# Patient Record
Sex: MALE | Race: WHITE | NOT HISPANIC OR LATINO
[De-identification: names, ages, dates, MRNs, and addresses within clinical notes are randomized per-mention and may not be internally consistent; named-entity substitution may affect disease eponyms.]

---

## 2017-02-08 ENCOUNTER — APPOINTMENT (OUTPATIENT)
Dept: SURGERY | Facility: CLINIC | Age: 72
End: 2017-02-08

## 2017-02-08 VITALS
TEMPERATURE: 98.2 F | HEART RATE: 48 BPM | DIASTOLIC BLOOD PRESSURE: 80 MMHG | RESPIRATION RATE: 16 BRPM | OXYGEN SATURATION: 97 % | SYSTOLIC BLOOD PRESSURE: 118 MMHG

## 2017-12-07 ENCOUNTER — APPOINTMENT (OUTPATIENT)
Dept: SURGERY | Facility: CLINIC | Age: 72
End: 2017-12-07
Payer: MEDICARE

## 2017-12-07 VITALS
OXYGEN SATURATION: 97 % | HEART RATE: 50 BPM | TEMPERATURE: 97.8 F | SYSTOLIC BLOOD PRESSURE: 112 MMHG | RESPIRATION RATE: 15 BRPM | DIASTOLIC BLOOD PRESSURE: 82 MMHG

## 2017-12-07 PROCEDURE — 99212 OFFICE O/P EST SF 10 MIN: CPT | Mod: 25

## 2017-12-07 PROCEDURE — 46600 DIAGNOSTIC ANOSCOPY SPX: CPT

## 2017-12-07 RX ORDER — HYDROCORTISONE ACETATE AND PRAMOXINE HYDROCHLORIDE 25; 10 MG/G; MG/G
2.5-1 CREAM TOPICAL 3 TIMES DAILY
Qty: 30 | Refills: 3 | Status: DISCONTINUED | COMMUNITY
Start: 2017-02-08 | End: 2017-12-07

## 2018-02-14 ENCOUNTER — APPOINTMENT (OUTPATIENT)
Dept: SURGERY | Facility: CLINIC | Age: 73
End: 2018-02-14
Payer: MEDICARE

## 2018-02-14 VITALS
RESPIRATION RATE: 15 BRPM | WEIGHT: 170 LBS | TEMPERATURE: 98 F | DIASTOLIC BLOOD PRESSURE: 64 MMHG | BODY MASS INDEX: 23.03 KG/M2 | HEART RATE: 49 BPM | HEIGHT: 72 IN | OXYGEN SATURATION: 99 % | SYSTOLIC BLOOD PRESSURE: 114 MMHG

## 2018-02-14 DIAGNOSIS — K62.89 OTHER SPECIFIED DISEASES OF ANUS AND RECTUM: ICD-10-CM

## 2018-02-14 PROCEDURE — 99214 OFFICE O/P EST MOD 30 MIN: CPT | Mod: 25

## 2018-02-14 PROCEDURE — 46600 DIAGNOSTIC ANOSCOPY SPX: CPT

## 2018-11-13 PROBLEM — L29.0 PRURITUS ANI: Status: ACTIVE | Noted: 2018-02-14

## 2018-11-14 ENCOUNTER — APPOINTMENT (OUTPATIENT)
Dept: SURGERY | Facility: CLINIC | Age: 73
End: 2018-11-14
Payer: MEDICARE

## 2018-11-14 VITALS
SYSTOLIC BLOOD PRESSURE: 122 MMHG | HEART RATE: 67 BPM | OXYGEN SATURATION: 98 % | TEMPERATURE: 98.2 F | RESPIRATION RATE: 16 BRPM | DIASTOLIC BLOOD PRESSURE: 69 MMHG

## 2018-11-14 DIAGNOSIS — M62.838 OTHER MUSCLE SPASM: ICD-10-CM

## 2018-11-14 DIAGNOSIS — L29.0 PRURITUS ANI: ICD-10-CM

## 2018-11-14 PROCEDURE — 46600 DIAGNOSTIC ANOSCOPY SPX: CPT

## 2018-11-14 PROCEDURE — 99213 OFFICE O/P EST LOW 20 MIN: CPT | Mod: 25

## 2018-11-14 RX ORDER — HYDROCORTISONE 25 MG/G
2.5 CREAM TOPICAL 3 TIMES DAILY
Qty: 30 | Refills: 3 | Status: DISCONTINUED | COMMUNITY
Start: 2017-12-13 | End: 2018-11-14

## 2018-11-14 RX ORDER — SILVER SULFADIAZINE 10 MG/G
1 CREAM TOPICAL
Qty: 400 | Refills: 3 | Status: DISCONTINUED | COMMUNITY
Start: 2018-02-14 | End: 2018-11-14

## 2018-12-04 RX ORDER — MINERAL,LANOLIN OILS/PROP GLYC
LOTION (ML) TOPICAL
Qty: 1 | Refills: 2 | Status: ACTIVE | COMMUNITY
Start: 2018-12-04 | End: 1900-01-01

## 2019-11-13 ENCOUNTER — APPOINTMENT (OUTPATIENT)
Dept: SURGERY | Facility: CLINIC | Age: 74
End: 2019-11-13
Payer: MEDICARE

## 2019-11-13 DIAGNOSIS — Z83.79 FAMILY HISTORY OF OTHER DISEASES OF THE DIGESTIVE SYSTEM: ICD-10-CM

## 2019-11-13 DIAGNOSIS — G10 HUNTINGTON'S DISEASE: ICD-10-CM

## 2019-11-13 PROCEDURE — 46600 DIAGNOSTIC ANOSCOPY SPX: CPT

## 2019-11-13 PROCEDURE — 99212 OFFICE O/P EST SF 10 MIN: CPT | Mod: 25

## 2019-11-13 RX ORDER — OLMESARTAN MEDOXOMIL 20 MG/1
20 TABLET, FILM COATED ORAL
Refills: 0 | Status: DISCONTINUED | COMMUNITY
End: 2019-11-13

## 2019-11-13 NOTE — PHYSICAL EXAM
[FreeTextEntry1] : Perianal inspection digital exam and anoscopy remarkable for mild hemorrhoid enlargement. Perianal skin unremarkable. No sphincter spasm.

## 2019-11-13 NOTE — ASSESSMENT
[FreeTextEntry1] : No active anorectal pathology that requires treatment. Patient should return p.r.n.

## 2019-11-13 NOTE — HISTORY OF PRESENT ILLNESS
[FreeTextEntry1] : Mauri is a 73 y/o male here for a follow-up visit. Patient had levator spasm after IRC years ago. Spasm successfully managed with physical therapy. \par Patient last seen 11/14/18 with pruritus ani and levator spasm. Patient was advised to resume PT if symptoms recurred. \par Patient reports rare minor spasms with BMs. Denies bleeding. Patient has 1 normal formed BM daily. \par Takes fiber supplement daily. \par Diagnosed with Huntingtons Disease 4 months ago.

## 2019-11-21 ENCOUNTER — OUTPATIENT (OUTPATIENT)
Dept: OUTPATIENT SERVICES | Facility: HOSPITAL | Age: 74
LOS: 1 days | End: 2019-11-21
Payer: MEDICARE

## 2019-11-21 DIAGNOSIS — R13.10 DYSPHAGIA, UNSPECIFIED: ICD-10-CM

## 2019-11-21 PROCEDURE — 74230 X-RAY XM SWLNG FUNCJ C+: CPT | Mod: 26

## 2019-11-21 PROCEDURE — A9698: CPT

## 2019-11-21 PROCEDURE — 74230 X-RAY XM SWLNG FUNCJ C+: CPT

## 2019-11-21 PROCEDURE — 92611 MOTION FLUOROSCOPY/SWALLOW: CPT

## 2019-11-21 NOTE — ASSESSMENT
[FreeTextEntry1] : Videofluroscopy and MODIFIED BARIUM SWALLOW STUDY\par \par Date of Report: 11/21/2019\par Date of Evaluation: 11/21/2019\par Patient Name: Mauri Membreno\par YOB: 1945\par Date of Onset: ~3 years ago\par Primary Diagnosis: Dysphagia\par Treatment Diagnosis: Dysphagia\par Referring Physician: Dr. Salinas Douglas\par \par Results/Impressions:\par 1. There was trace laryngeal penetration during the swallow with complete retrieval when consuming small cup sips of thin liquids.\par 2. There was gross laryngeal penetration during the swallow with complete retrieval when consuming large/consecutive cup sips of thon liquids.\par 3. There was no penetration and/or aspiration pre/during/post swallow when consuming nectar thick liquids, puree, and regular solids.\par \par Recommendations:\par 1. The patient was advised to consume regular solids with thin liquids via SINGLE/SMALL CUP SIPS.\par 2. The patient was advised to sit upright at a 90 degree angle, complete full mastication of solid textures, allow for swallow between intakes, consume small bites and small cup sips, pace meal slowly, and remain upright 15-30 minutes post swallow.\par 3. Consider short-term swallowing therapy to maximize overall swallow mechanism.\par 4. Follow up with physician as directed.\par \par History: This 74 year old male was seen this morning for a Modified Barium Swallow Study to: _x__rule out aspiration; __x_assess for diet texture change as appropriate; and/or _x__ explore positional strategies and/or compensatory techniques to eliminate penetration/aspiration. The physician ordered this procedure because he wants the patient to meet their nutrition/hydration needs by mouth without compromising respiratory status.\par \par Patient arrived to today’s assessment with concerns regarding his swallow function. Patient was recently diagnosed with Willard’s disease (~4 months ago) and has been experiencing ongoing swallow difficulty for ~3 years. Patient reports he had a swallow assessment completed ~1.5 years ago at Dr. Douglas’s office, at which time results were deemed within normal limits and was advised to complete reassessment 1 year from that date. Patient characterizes swallowing difficulty by intermittent coughing during deglutition (exacerbates with chorea) and intermittent wet vocal quality post swallow. Patient denies change in breathing during deglutition and sensation of pharyngeal stasis. Patient reports he believes he had pneumonia several years ago, but has not been recurrent. Patient reports ~5 pound weight loss over the course of 1 year, which he attributes to chorea. Patient reports he has continued to consume regular solids with thin liquids and incorporates safe swallowing strategies of small bites/sips and pacing meal slowly. \par \par Current Nutritional Intake: regular solids with thin liquids.\par \par Medical History: As per medical chart, the patient’s medical history is significant for:\par Active Problems\par A-fib (427.31) (I48.91)\par Anal pain (569.42) (K62.89)\par Backache (724.5) (M54.9)\par Cervical spondylosis (721.0) (M47.812)\par Disc degeneration, lumbar (722.52) (M51.36)\par Hearing deficit (389.9) (H91.90)\par HTN (hypertension) (401.9) (I10)\par Internal hemorrhoids (455.0) (K64.8)\par Levator spasm (728.85) (M62.838)\par Lumbar strain (847.2) (S39.012A)\par Memory deficit (780.93) (R41.3)\par MARLENA (obstructive sleep apnea) (327.23) (G47.33)\par Pruritus ani (698.0) (L29.0)\par Tendonitis (726.90) (M77.9)\par TMJ (temporomandibular joint syndrome) (524.60) (M26.609)\par \par Past Medical History\par History of arthritis (V13.4) (Z87.39)\par History of Raynaud's syndrome (443.0) (I73.00)\par \par Surgical History\par History of Cardiac Catheter His Ablation\par History of Hemorrhoidectomy\par \par Current Respiratory Status: __x_ Normal ___ Tracheostomy Tube \par \par ASSESSMENT\par \par The patient was assessed sitting upright in chair in the lateral plane in the [facility], with Radiologist present. The patient was _x_ alert ___lethargic _x__cooperative ___uncooperative.\par Secretion management was __x_adequate ___inadequate. \par Patient observed to have one episode of dry coughing during the study in the absence of penetration/aspiration.\par \par Consistencies Administered: \par Solids: _x_ Regular ___Mechanical Soft _x_Puree \par Liquids: _x_ Thin _x_ Nectar Thick ___Honey Thick \par _x_ single cup sips _x_ consecutive cup sips\par \par SUMMARY & IMPRESSION\par The patient demonstrated the following:\par \par Videofluoroscopic Evaluation Reveals:\par \par 1. The patient demonstrated a mild oral dysphagia when given puree, regular solids, nectar thick liquids, and thin liquids marked by adequate bolus retrieval and containment, timely mastication of solids, reduced bolus cohesion with liquids resulting in premature spillage to oropharynx, adequate bolus cohesion with puree/solids, timely oral transit, and adequate oral clearance post swallow.\par 2. The patient demonstrated a mild pharyngeal dysphagia when given thin liquids marked by timely pharyngeal swallow trigger post spillage at the level of the valleculae, adequate BOT retraction, adequate pharyngeal constriction, and mildly reduced hyolaryngeal elevation/excursion with incomplete/delayed epiglottic retroflexion. There was trace laryngeal penetration with complete retrieval via single/small cup sips. There was gross laryngeal penetration with complete retrieval via large/consecutive cup sips. There was trace pharyngeal residue post swallow in the valleculae and pyriforms.\par 3. The patient demonstrated a functional pharyngeal phase when given puree, regular solids, and nectar thick liquids marked by timely pharyngeal swallow trigger (post spillage with nectar thick liquids at the level of the valleculae), adequate BOT retraction, adequate pharyngeal contractility, adequate hyolaryngeal elevation/excursion, and complete epiglottic retroflexion. There was no penetration and/or aspiration pre/during/post swallow. There was adequate pharyngeal clearance across all textures.\par \par Aspiration - Penetration Scale:\par 2: Thin liquids\par 1: Nectar thick liquids, puree, and regular solids\par \par Aspiration - Penetration Scale (Sonyk et al Dysphagia 11:93-98 (April 1996), Aspiration-Penetration Scale)\par 1. Material does not enter the airway\par 2. Material enters the airway, remains above the vocal folds, and is\par ejected from the airway\par 3. Material enters the airway, remains above the vocal folds, and is not\par ejected\par 4. Material enters the airway, contacts the vocal folds, and is ejected\par from the airway\par 5. Material enters the airway, contacts the vocal folds, and is not ejected\par from the airway\par 6. Material enters the airway, passes below the vocal folds and is ejected\par into the larynx or out of the airway\par 7. Material enters the airway, passes below the vocal folds, and is not\par ejected from the trachea despite effort\par 8. Material enters the airway, passes below the vocal folds, and no effort\par is made to eject\par \par \par The above results and recommendations have been discussed with the patient. Should you have any additional concerns, please contact the Center at [phone].\par \par \par Shabnam Lyles M.S. CCC-SLP\par Speech-Language Pathologist\par [facility] [place].

## 2020-12-24 RX ORDER — HYDROCORTISONE 2.5% 25 MG/G
2.5 CREAM TOPICAL DAILY
Qty: 1 | Refills: 0 | Status: ACTIVE | COMMUNITY
Start: 2020-12-24 | End: 1900-01-01

## 2021-04-28 ENCOUNTER — APPOINTMENT (OUTPATIENT)
Dept: SURGERY | Facility: CLINIC | Age: 76
End: 2021-04-28
Payer: MEDICARE

## 2021-04-28 VITALS — TEMPERATURE: 97.2 F | HEART RATE: 50 BPM | OXYGEN SATURATION: 100 % | RESPIRATION RATE: 16 BRPM

## 2021-04-28 DIAGNOSIS — K64.8 OTHER HEMORRHOIDS: ICD-10-CM

## 2021-04-28 PROCEDURE — 46600 DIAGNOSTIC ANOSCOPY SPX: CPT

## 2021-04-28 PROCEDURE — 99213 OFFICE O/P EST LOW 20 MIN: CPT | Mod: 25

## 2021-04-28 RX ORDER — MIRTAZAPINE 15 MG/1
15 TABLET, FILM COATED ORAL
Refills: 0 | Status: ACTIVE | COMMUNITY

## 2021-04-28 RX ORDER — CYCLOBENZAPRINE HYDROCHLORIDE 10 MG/1
10 TABLET, FILM COATED ORAL
Refills: 0 | Status: ACTIVE | COMMUNITY

## 2021-04-28 RX ORDER — GUAR GUM
PACKET (EA) ORAL
Refills: 0 | Status: DISCONTINUED | COMMUNITY
End: 2021-04-28

## 2021-04-28 NOTE — PHYSICAL EXAM
[FreeTextEntry1] : Perianal inspection digital exam and anoscopy demonstrated minimal hemorrhoid enlargement.  No evidence of levator spasm.

## 2021-04-28 NOTE — HISTORY OF PRESENT ILLNESS
[FreeTextEntry1] : Mauri is a 76 y/o male with h/o Huntingtons Disease, here for a follow-up visit. Patient had levator spasm after IRC years ago. Spasm successfully managed with physical therapy. He was last seen 11/13/2019- Perianal inspection digital exam and anoscopy remarkable for mild hemorrhoid enlargement. Perianal skin unremarkable. No sphincter spasm. \par Today her reports has been having rectal discomfort. Moving bowels daily no pain or bleeding.

## 2021-04-28 NOTE — ASSESSMENT
[FreeTextEntry1] : Patient states that his symptoms are better.  No significant findings other than mild internal hemorrhoid enlargement on exam.  Since patient is asymptomatic at this time he will continue with conservative treatment and follow-up as needed.

## 2021-11-24 ENCOUNTER — RESULT REVIEW (OUTPATIENT)
Age: 76
End: 2021-11-24

## 2022-03-17 ENCOUNTER — APPOINTMENT (OUTPATIENT)
Dept: SURGERY | Facility: CLINIC | Age: 77
End: 2022-03-17
Payer: MEDICARE

## 2022-03-17 VITALS
TEMPERATURE: 97.3 F | BODY MASS INDEX: 22.35 KG/M2 | OXYGEN SATURATION: 99 % | RESPIRATION RATE: 17 BRPM | WEIGHT: 165 LBS | HEART RATE: 63 BPM | DIASTOLIC BLOOD PRESSURE: 80 MMHG | SYSTOLIC BLOOD PRESSURE: 122 MMHG | HEIGHT: 72 IN

## 2022-03-17 DIAGNOSIS — K59.4 ANAL SPASM: ICD-10-CM

## 2022-03-17 PROCEDURE — 46600 DIAGNOSTIC ANOSCOPY SPX: CPT

## 2022-03-17 PROCEDURE — 99214 OFFICE O/P EST MOD 30 MIN: CPT | Mod: 25

## 2022-03-17 RX ORDER — MEMANTINE HYDROCHLORIDE 5 MG-10 MG
KIT ORAL
Refills: 0 | Status: DISCONTINUED | COMMUNITY
End: 2022-03-17

## 2022-03-17 RX ORDER — RIVASTIGMINE TARTRATE 6 MG/1
CAPSULE ORAL
Refills: 0 | Status: ACTIVE | COMMUNITY

## 2022-03-17 NOTE — ASSESSMENT
[FreeTextEntry1] : Patient did well with physical therapy in the past.  Prescription given.  Follow-up as needed.

## 2022-03-17 NOTE — PHYSICAL EXAM
[FreeTextEntry1] : Perianal inspection digital exam and anoscopy demonstrated minimal hemorrhoid enlargement and mild sphincter spasm.\par \par \par \par Anoscopy performed to evaluate internal hemorrhoids.  No sedation required

## 2022-03-17 NOTE — HISTORY OF PRESENT ILLNESS
[FreeTextEntry1] : Mauri is a 76 year old male here for follow up visit, rectal discomfort.  Patient has had intermittent rectal discomfort for years.  He has a history of levator spasm successfully managed with physical therapy.\par \par 10/21/11 Colonoscopy - Normal terminal ileum. Diverticulosis. Internal hemorrhoids. \par \par Last seen 4/28/21 - Patient states that his symptoms are better. No significant findings other than mild internal hemorrhoid enlargement on exam. Since patient is asymptomatic at this time he will continue with conservative treatment and follow-up as needed. \par \par Today patient reports feeling well. Reports having some burning discomfort with BMs that lingers. Denies any prolapsing tissue. Has one soft BM daily.  Reports using Proctozone cream sometimes with good relief of symptoms. Denies bleeding with BMs. Good appetite, denies nausea, vomiting, fever or chills.